# Patient Record
Sex: FEMALE | URBAN - METROPOLITAN AREA
[De-identification: names, ages, dates, MRNs, and addresses within clinical notes are randomized per-mention and may not be internally consistent; named-entity substitution may affect disease eponyms.]

---

## 2022-03-10 LAB — CRC RECOMMENDATION EXT: NORMAL

## 2024-06-17 ENCOUNTER — PATIENT OUTREACH (OUTPATIENT)
Dept: ADMINISTRATIVE | Facility: HOSPITAL | Age: 49
End: 2024-06-17

## 2024-06-17 NOTE — PROGRESS NOTES
GYN patient called stating that her prescriptions need to go to another pharmacy. Pt is requesting OCP and Valtrex to go to [facility]. Prescriptions resent.     Orders Placed This Encounter    norethindrone (ORTHO MICRONOR) 0.35 MG tablet     Sig: Take 1 tablet by mouth daily     Dispense:  84 tablet     Refill:  3    valACYclovir (VALTREX) 1 g tablet     Sig: Take 1 tablet by oral route twice daily for 7-10 days as needed     Dispense:  20 tablet     Refill:  0 Population Health Chart Review & Patient Outreach Details      Additional [facility] Notes:    Non-compliant report chart audits for COLON CANCER SCREENING. Chart review completed for HM test overdue (mammograms, Colonoscopies, pap smears, DM labs, and/or EYE EXAMs)      Care Everywhere and media, updates requested and reviewed.                 Updates Requested / Reviewed:      Care Everywhere, , External Sources: [place], and Care Team Updated         Health Maintenance Topics Overdue:      [facility] Score: 1     Colon Cancer Screening                       Health Maintenance Topic(s) Outreach Outcomes & Actions Taken:    Colorectal Cancer Screening - Outreach Outcomes & Actions Taken  : External Records Uploaded, Care Team Updated, & History Updated if Applicable